# Patient Record
Sex: MALE | Race: WHITE | NOT HISPANIC OR LATINO | URBAN - METROPOLITAN AREA
[De-identification: names, ages, dates, MRNs, and addresses within clinical notes are randomized per-mention and may not be internally consistent; named-entity substitution may affect disease eponyms.]

---

## 2017-07-27 ENCOUNTER — IMPORTED ENCOUNTER (OUTPATIENT)
Dept: URBAN - METROPOLITAN AREA CLINIC 31 | Facility: CLINIC | Age: 22
End: 2017-07-27

## 2017-07-27 PROCEDURE — 92015 DETERMINE REFRACTIVE STATE: CPT

## 2017-07-27 PROCEDURE — 92004 COMPRE OPH EXAM NEW PT 1/>: CPT

## 2017-07-27 PROCEDURE — 92310 CONTACT LENS FITTING OU: CPT

## 2019-01-03 NOTE — PATIENT DISCUSSION
Advised to continue Omega 3 fatty acids, Flaxseed Oil, in supplements. 2-4 grams a day.
Chilled artificial tears prn.
Monitor.
OTC antihistamine drops as needed.
Recommend Bilateral lower lid blepharoplasty trans conj laser (discussed risks and benefits of sx. .. ).
Recommend Bilateral upper lid blepharoplasty. Medicare/predeterm/cosmetic (discussed risks and benefits of sx. .. ).
Recommended artificial tears to use: 1 drop 4x a day in both eyes.
Recommended warm compresses.
Stable.
Normal for race

## 2019-04-16 NOTE — PATIENT DISCUSSION
Recommend Bilateral upper lid blepharoplasty. Medicare/predeterm/cosmetic (discussed risks and benefits of sx. .. ).

## 2020-01-16 NOTE — PATIENT DISCUSSION
This visual field clearly demonstrated a minimum of 48% loss of upper field of vision OU, with upper lid skin in repose and elevated by taping of the lid to demonstrate potential correction. This field shows that taping the lids significantly improved this patient's superior field of vision by approximately 47%, OU.

## 2020-07-13 NOTE — PATIENT DISCUSSION
Take ZPak by mouth as directed, use warm compresses on area for 10 minutes 3 times a day for 10 days.

## 2021-02-06 NOTE — PATIENT DISCUSSION
Consult    Zeeshan Matias is a 65 y.o. female who was seen by synchronous (real-time) audio-video technology on 2021. REFERRED BY:  Isa Joya MD    CHIEF COMPLAINT: Seizures      Subjective:     Zeeshan Matias is a 65 y.o. right-handed  female, seen for follow-up of her seizures, and the patient currently takes 750 mg of Depakote in the morning, 500 at lunch, and 750 mg at night, and had a recent Depakote level done that was 75 by her PCP, and her last seizure was 33 years ago and she remained under good control. She was previously on Dilantin went off of that because of osteoporosis, and is doing well with her endocrinologist and that has been corrected. She questions whether she should get the vaccine, and we strongly advised her to get the vaccine as soon as possible when it was her time. 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 She is doing well on Depakote. Routine metabolic studies are drawn today and her Depakote was renewed also for her. Has had no other new medical problems, neurological problems, and continues to do well. We discussed the possible benefits of coming off the medication, but she would prefer to take the medication because she is done so well for so long and does not want to risk having a seizure.     Past Medical History:   Diagnosis Date    Arthritis     Hyperlipidemia     Hypothyroidism (acquired)     Iron deficiency anemia     Osteopenia 2013    Seizures (HCC)     Unspecified epilepsy without mention of intractable epilepsy       Past Surgical History:   Procedure Laterality Date    HX  SECTION       Family History   Problem Relation Age of Onset    Other Mother         fibromyalgia, sarcoidosis    Thyroid Disease Mother         hypothyroidism    Other Father 27        car accident    Heart Disease Maternal Grandmother     Cancer Maternal Grandmother         kidney    Cancer OTC antihistamine drops as needed. Maternal Grandfather         colon      Social History     Tobacco Use    Smoking status: Former Smoker     Packs/day: 1.50     Years: 19.00     Pack years: 28.50     Quit date: 2003     Years since quittin.0    Smokeless tobacco: Former User   Substance Use Topics    Alcohol use: No         Current Outpatient Medications:     divalproex DR (DEPAKOTE) 250 mg tablet, 3 p.o. every morning, 2 p.o. at lunchtime, and 3 p.o. at nighttime, Disp: 720 Tab, Rfl: 5    losartan (COZAAR) 100 mg tablet, TAKE 1 TABLET BY MOUTH DAILY. REPLACES LOSARTAN-HCTZ 100/12.5 MG TABS, Disp: 90 Tab, Rfl: 3    hydroCHLOROthiazide (HYDRODIURIL) 12.5 mg tablet, TAKE 1 TABLET BY MOUTH DAILY. REPLACES LOSARTAN-HCTZ 100/12.5 MG TABS, Disp: 90 Tab, Rfl: 3    levothyroxine (SYNTHROID) 112 mcg tablet, TAKE 1 TABLET BY MOUTH EVERY DAY--Delete 100 mcg dose from profile, Disp: 90 Tab, Rfl: 3    clonazePAM (KlonoPIN) 0.5 mg tablet, Take 1 Tab by mouth every eight (8) hours as needed (seizure). , Disp: 30 Tab, Rfl: 5    tiZANidine (ZANAFLEX) 4 mg tablet, Take 1 tab as needed for muscle spasms, Disp: , Rfl:     cholecalciferol (VITAMIN D3) 1,000 unit cap, Take  by mouth daily. , Disp: , Rfl:     pravastatin (PRAVACHOL) 20 mg tablet, 40 mg. Take 1 tab daily, Disp: , Rfl:     BIOTIN, BULK,, by Does Not Apply route daily. , Disp: , Rfl:         Allergies   Allergen Reactions    Crestor [Rosuvastatin] Myalgia    Lisinopril Cough    Macrobid [Nitrofurantoin Monohyd/M-Cryst] Other (comments)     headaches      MRI Results (most recent):  Results from Hospital Encounter encounter on 12   MRI FOOT LT WO CONT    Narrative **Final Report**       ICD Codes / Adm. Diagnosis: 729.5  845.10 / Pain in limb  Sprain of foot,   unspecified si  Examination:   FOOT WO CON LT  - [id number] - Dec 17 2012  1:07PM  Accession No:  [id number]  Reason:  FOOT SPRAIN, FOOT REGION: Whole      REPORT:  INDICATION: Left foot pain, sprain    COMPARISON: None    EXAM: Sagittal T1-weighted spin-echo and fat-suppressed T2-weighted fast   spin-echo, sagittal inversion recovery, and short axis and long axis   TI-weighted spin-echo and fat-suppressed T2-weighted fast spin-echo MR   images of the left forefoot are obtained. FINDINGS: There is a moderately prominent subcutaneous edema dorsal to the   third and fourth metatarsal bones. There is diffusely abnormal signal of   the third and fourth metatarsal distal diaphyses, necks and heads, with   parosteal edema-like signal most prominently demonstrated laterally at the   neck of the third metatarsal bone. There is a small effusion of the third   metatarsophalangeal joint. No tendon derangement is evident nor is there   bone or soft tissue mass. IMPRESSION: Edema like signal of the third and fourth metatarsal shafts   through heads. These findings could represent fractures or other causes for   osseous edema including osteomyelitis. Clinical correlation recommended. Signing/Reading Doctor: Oswald Gore ([id number])    Approved: MAURICIO Gore ([id number])  Dec 17 2012  1:18PM                                    Results from Hospital Encounter encounter on 12/17/12   MRI FOOT LT WO CONT    Narrative **Final Report**       ICD Codes / Adm. Diagnosis: 729.5  845.10 / Pain in limb  Sprain of foot,   unspecified si  Examination:  MR FOOT WO CON LT  - [id number] - Dec 17 2012  1:07PM  Accession No:  [id number]  Reason:  FOOT SPRAIN, FOOT REGION: Whole      REPORT:  INDICATION: Left foot pain, sprain    COMPARISON: None    EXAM: Sagittal T1-weighted spin-echo and fat-suppressed T2-weighted fast   spin-echo, sagittal inversion recovery, and short axis and long axis   TI-weighted spin-echo and fat-suppressed T2-weighted fast spin-echo MR   images of the left forefoot are obtained. FINDINGS: There is a moderately prominent subcutaneous edema dorsal to the   third and fourth metatarsal bones.   There is diffusely abnormal signal of   the third and fourth metatarsal distal diaphyses, necks and heads, with   parosteal edema-like signal most prominently demonstrated laterally at the   neck of the third metatarsal bone. There is a small effusion of the third   metatarsophalangeal joint. No tendon derangement is evident nor is there   bone or soft tissue mass. IMPRESSION: Edema like signal of the third and fourth metatarsal shafts   through heads. These findings could represent fractures or other causes for   osseous edema including osteomyelitis. Clinical correlation recommended. Signing/Reading Doctor: Mila Biggs ([id number])    Approved: MAURICIO Biggs ([id number])  Dec 17 2012  1:18PM                                  Review of Systems:  A comprehensive review of systems was negative except for: Neurological: positive for seizures   There were no vitals filed for this visit. Objective:     I      NEUROLOGICAL EXAM:    Appearance: The patient is well developed, well nourished, provides a coherent history and is in no acute distress. Mental Status: Oriented to time, place and person, and the president, cognitive function is normal and speech is fluent and no aphasia or dysarthria. Mood and affect appropriate. Cranial Nerves:   Intact visual fields. Fundi are not testable. BOBO, EOM's full, no nystagmus, no ptosis. Facial sensation is normal to touch. Corneal reflexes are not tested. Facial movement is symmetric. Hearing is normal bilaterally. Palate is midline with normal sternocleidomastoid and trapezius muscles are normal. Tongue is midline. Neck without meningismus   Temporal arteries are not tender or enlarged  TMJ areas are not tender on palpation   Motor:  5/5 strength in upper and lower proximal and distal muscles. Normal bulk and tone. No fasciculations.   Rapid alternating movement is symmetric and intact bilaterally   Reflexes:   Deep tendon reflexes, Babinski and clonus could not be tested   Sensory:   Normal to touch, pinprick and vibration and temperature and double simultaneous stimulation cannot be tested. Gait:  Normal gait for patient's age. Tremor:   No tremor noted. Cerebellar:  No abnormal cerebellar signs present on Romberg and tandem testing and finger-nose-finger exam.   Neurovascular:  Cardiac examination and carotid examination and peripheral arterial examination could not be tested           Assessment:       ICD-10-CM ICD-9-CM    1. Complex partial seizure evolving to generalized seizure ([facility] [address]  G40.209 345.40 divalproex DR (DEPAKOTE) 250 mg tablet      CBC WITH AUTOMATED DIFF      METABOLIC PANEL, COMPREHENSIVE      VALPROIC ACID   2. Hypothyroidism due to acquired atrophy of thyroid  E03.4 244.8 divalproex DR (DEPAKOTE) 250 mg tablet     246.8 CBC WITH AUTOMATED DIFF      METABOLIC PANEL, COMPREHENSIVE      VALPROIC ACID     Active Problems:    * No active hospital problems. *      Plan:     Patient doing fairly well on her current medication without a seizure and 33 years. We reviewed her medication, and did metabolic studies to make sure her liver and hematologic functions are stable on her medication, and her recent lab level was 75 on the Depakote. She is taking 750 mg in the morning and 500 mg at lunch and 750 mg at night and tolerating the medication well without side effects. Her medication was renewed for her today also. She has used the 250 mg Depakote because the larger dose tablets are too hard to swallow for her. She has had no other new medical or neurologic problems. We discussed the benefits of the vaccine and strongly encouraged her to make sure she gets it soon as it is available to her. 30 minutes spent with the patient going over all this with her, and follow-up will be in 1 years time or earlier if need be.     Signed By: Kate Valle MD     February 5, 2021       Pursuant to the emergency declaration under the [address] and the National Emergencies Act, [address] [place] [employer] waiver authority and the [employer] and [employer] Act, this Virtual  Visit was conducted, with patient's consent, to reduce the patient's risk of exposure to COVID-19 and provide continuity of care for an established patient. Services were provided through a video synchronous discussion virtually to substitute for in-person clinic visit.         CC: Bethanie Gabriel MD  FAX: [phone]

## 2021-06-08 NOTE — PATIENT DISCUSSION
Take Keflex by mouth tid 7 days and use hot spot on the area for 10 minutes 3 times a day for 10 days.

## 2021-09-02 NOTE — PATIENT DISCUSSION
What Type Of Note Output Would You Prefer (Optional)?: Standard Output pt did not like Shelli. What Is The Reason For Today's Visit?: Full Body Skin Examination What Is The Reason For Today's Visit? (Being Monitored For X): the development of new lesions How Severe Are Your Spot(S)?: mild

## 2021-09-02 NOTE — PATIENT DISCUSSION
KRYSTINA KATZ    Patient Age: 40 year old    [unfilled]  MESSAGE:   Patients significant other calling for Post-Op advice regarding Instructions in regards to a follow up appointment     Surgeon: Joe Wen MD    Date of Surgery: 12/27/2020    Type of Surgery:Right tibial nail    Patient does not have insurance and will be self-pay.    Call connected to Triage Nurse for assistance.         Next and Last Visit with Provider and Department  Last visit with Joe Wen MD: 12/29/2020  Last visit with this department: 12/29/2020    Next appt with Joe Wen MD: Visit date not found  Next appt with this department: Visit date not found    WEIGHT AND HEIGHT:       ALLERGIES:  Not on File  No current outpatient medications on file.      PHARMACY to use:           Pharmacy preference(s) on file: The requested medication(s) have been refilled.  The refill request(s) was within refill protocol.  Refill(s) were authorized by pharmacist.    CALL BACK INFO: Ok to leave response (including medical information) with family member or on ans. machine  ROUTING: Patient's physician / staff    PCP: Verify Pcp         INS: No billing information found for this encounter.   ADDRESS:  [address] [place] [id number]        Recommend ultra 1cc's of Juvederm (discussed risks and benefits. .. ).

## 2021-11-12 NOTE — PATIENT DISCUSSION
Patient educated on condition. Based upon lack of constitutional symptoms, normal visual field, normal pupil testing, and normal acuity, doubt ischemic optic neuropathy/ temporal arteritis, optic neuritis, compressive lesion. Suspect infectious etiology, especially Lyme. Will obtain neuroimaging persoanlly and serology through PCP. *11/12/21- 4:30 pm - MRI already performed- awaiting results.

## 2021-11-17 NOTE — PATIENT DISCUSSION
Patient educated on condition. Based upon lack of constitutional symptoms, normal visual field, normal pupil testing, and normal acuity, doubt ischemic optic neuropathy/ temporal arteritis, optic neuritis, compressive lesion. Suspect infectious etiology, especially Lyme. Will obtain neuroimaging personally and serology through PCP. *11/12/21- 4:30 pm - MRI already performed- awaiting results. 11/17/21- MRI received and reviewed on 11/15/21- normal results. Reaching out to radiologist for repeat read with attention to ON Sheath for possible perineuritis. Blood work ordered through Dr. Ludmila Valdes- awaiting results.

## 2021-12-02 NOTE — PATIENT DISCUSSION
MRI normal and blood work normal except for HSV titres and EBV titres indicating old infection but no active disease. MRI normal. 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 No treatment at this time. Anticipate spontaneous improvement with possibility of retinal bleeding in the future. Pt well educated. Pt to call if worsening vision ensues.

## 2021-12-20 NOTE — PATIENT DISCUSSION
Recommend Mini lower lift, post-tragel, SMAS to chin(discussed risks and benefits of sx. .. ). Detail Level: Zone Detail Level: Generalized Detail Level: Simple

## 2021-12-20 NOTE — PATIENT DISCUSSION
12/20/21- pt has mild increase in disc bleeding but not leeann evolution into true CRVO at this point. Pt has increasing symptoms but still go visual function. Will consult with TB for possible IV anti-VEGF.

## 2022-01-05 NOTE — PATIENT DISCUSSION
12/20/21- pt has mild increase in disc bleeding but not leeann evolution into true CRVO at this point. Pt has increasing symptoms but still go visual function. Disc edema greatly improved.

## 2022-01-27 NOTE — PATIENT DISCUSSION
12/20/21- pt has mild increase in disc bleeding but not leeann evolution into true CRVO at this point. Pt has increasing symptoms but still go visual function. Disc edema greatly improved. normal (ped)...

## 2022-01-27 NOTE — PATIENT DISCUSSION
Improving in appearance and edema is decreasing, but disc is beginning to atrophy and visual function is beginning to decrease. Normal color perception and good acuity, but new pupil defect and field loss. All conditions ruled out medically. Will continue to observe. Will consult with neurology verbally to ascertain if any treatment will be available.

## 2022-04-02 ASSESSMENT — TONOMETRY
OD_IOP_MMHG: 13
OS_IOP_MMHG: 14

## 2022-04-02 ASSESSMENT — VISUAL ACUITY
OD_CC: 20/200
OS_CC: 20/100-1
OS_CC: J1+14''
OD_SC: 20/20
OD_CC: J1+14''
OS_SC: 20/20

## 2022-12-15 NOTE — PROCEDURE NOTE: CLINICAL
PROCEDURE NOTE: Biopsy of Eyelid Left Lower Lid. Diagnosis: Eyelid Lesion, Uncertain Behavior. Anesthesia: Local. Risks, benefits and alternatives discussed. Patient desires to proceed with biopsy today. A drop of proparacaine was instilled in the involved eye. Involved area was prepped using alcohol wipe and anesthetized using 1% lidocaine with epi. Lesion was excised using mini Maulik scissors and forceps and placed in formalin to be sent for histopathology. Wound was cauterized to achieve hemostasis and erythromycin ophthalmic ointment was applied. Patient tolerated procedure well. There were no complications. Post procedure instructions given. Latosha Brown